# Patient Record
Sex: FEMALE | Race: WHITE
[De-identification: names, ages, dates, MRNs, and addresses within clinical notes are randomized per-mention and may not be internally consistent; named-entity substitution may affect disease eponyms.]

---

## 2020-05-03 ENCOUNTER — HOSPITAL ENCOUNTER (EMERGENCY)
Dept: HOSPITAL 47 - EC | Age: 40
Discharge: HOME | End: 2020-05-03
Payer: COMMERCIAL

## 2020-05-03 VITALS
DIASTOLIC BLOOD PRESSURE: 67 MMHG | TEMPERATURE: 98.3 F | SYSTOLIC BLOOD PRESSURE: 107 MMHG | HEART RATE: 55 BPM | RESPIRATION RATE: 18 BRPM

## 2020-05-03 DIAGNOSIS — Z23: ICD-10-CM
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
## 2020-05-03 DIAGNOSIS — S56.122A: Primary | ICD-10-CM
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
## 2020-05-03 PROCEDURE — 90715 TDAP VACCINE 7 YRS/> IM: CPT

## 2020-05-03 PROCEDURE — 73130 X-RAY EXAM OF HAND: CPT

## 2020-05-03 PROCEDURE — 99283 EMERGENCY DEPT VISIT LOW MDM: CPT

## 2020-05-03 PROCEDURE — 90471 IMMUNIZATION ADMIN: CPT

## 2020-05-03 PROCEDURE — 12002 RPR S/N/AX/GEN/TRNK2.6-7.5CM: CPT

## 2020-05-03 NOTE — ED
Wound/Laceration HPI





- General


Chief Complaint: Wound/Laceration


Stated Complaint: Hand injury/laceration


Time Seen by Provider: 05/03/20 11:16


Source: patient, RN notes reviewed, old records reviewed


Mode of arrival: ambulatory


Limitations: no limitations





- History of Present Illness


Initial Comments: 





Patient is a 39-year-old female who presents the emergency department today for 

evaluation for laceration of her left second digit.  Patient reports that she 

cut it on a kitchen knife.  She reports that she is unable to flex her finger.  

Patient states that she has normal sensation distally.  Her tetanus shot is not 

up-to-date.





- Related Data


                                  Previous Rx's











 Medication  Instructions  Recorded


 


Ibuprofen [Motrin] 600 mg PO Q8HR PRN #20 tab 05/03/20











                                    Allergies











Allergy/AdvReac Type Severity Reaction Status Date / Time


 


No Known Allergies Allergy   Verified 05/03/20 11:15














Review of Systems


ROS Statement: 


Those systems with pertinent positive or pertinent negative responses have been 

documented in the HPI.





ROS Other: All systems not noted in ROS Statement are negative.





Past Medical History


Past Medical History: No Reported History


History of Any Multi-Drug Resistant Organisms: None Reported


Past Surgical History: No Surgical Hx Reported


Past Psychological History: No Psychological Hx Reported


Smoking Status: Never smoker


Past Alcohol Use History: None Reported


Past Drug Use History: None Reported





General Exam





- General Exam Comments


Initial Comments: 





Anxious 39 year old female, pale and nervous about laceration. 


Limitations: no limitations


General appearance: alert, in no apparent distress


Head exam: Present: atraumatic, normocephalic, normal inspection


Eye exam: Present: normal appearance


ENT exam: Present: normal exam, mucous membranes moist


Neck exam: Present: normal inspection.  Absent: tenderness, meningismus, 

lymphadenopathy


Respiratory exam: Present: normal lung sounds bilaterally.  Absent: respiratory 

distress, wheezes, rales, rhonchi, stridor


Cardiovascular Exam: Present: regular rate, normal rhythm, normal heart sounds. 

Absent: systolic murmur, diastolic murmur, rubs, gallop, clicks


GI/Abdominal exam: Present: soft, normal bowel sounds.  Absent: distended, 

tenderness, guarding, rebound, rigid


Extremities exam: Present: normal inspection


  ** Right


Upper Arm exam: Present: normal inspection, full ROM


Elbow exam: Present: normal inspection, full ROM


Forearm Wrist exam: Present: normal inspection, full ROM


Hand Wrist exam: Present: laceration (3cm laceration over left palmar aspect of 

index finger PIP with flexor tendon laceration. Unable to flex 2nd digit. ).  

Absent: normal inspection, full ROM


Back exam: Present: normal inspection


Neurological exam: Present: alert


Psychiatric exam: Present: normal affect, normal mood





Course


                                   Vital Signs











  05/03/20





  11:12


 


Temperature 98.3 F


 


Pulse Rate 55 L


 


Respiratory 18





Rate 


 


Blood Pressure 107/67


 


O2 Sat by Pulse 99





Oximetry 














Procedures





- Laceration


  ** Laceration #1


Site: hand (2nd digit palmar aspect PIP)


Size (cm): 3


Description: linear


Depth: simple, single layer


Anesthetic Used: lidocaine 1%


Anesthesia Technique: local infiltration


Amount (mls): 2


Pre-repair: wound explored, irrigated extensively


Type of Sutures: nylon


Size of Sutures: 5-0


Number of Sutures: 6


Technique: simple, interrupted


Patient Tolerated Procedure: well, no complications





- Orthopedic Splinting/Casting


  ** Injury #1


Side: left


Upper Extremity Injury Location: wrist, hand


Upper Extremity Immobilizer: volar splint





Medical Decision Making





- Medical Decision Making





PAtient is a 39 year old female with lacearation from kitchen knife over left 

palmar aspect PIP of 2nd digit. There is laceration of flexor tendon, as patient

is unable to bend at first digit. Unable to idenitify tendon in laceration, as 

it is likely retracted. Hand xray is negative for acute process. Patient case 

was discussed with Dr. Baez, and advised to close the laceration and she 

can follow up in office for reevaluation of tendon. Discussed this with patient 

and wound was closed with 6 sutures. Patient did have normal sensation distally 

to light touch. Patient was placed in  a volar splint and return parameters 

discussed. Patient will see ortho tomorrow. 





- Radiology Data


Radiology results: report reviewed


Normal hand xray. No acute osseous lesion. 





Disposition


Clinical Impression: 


 Finger laceration involving tendon





Disposition: HOME SELF-CARE


Condition: Good


Instructions (If sedation given, give patient instructions):  Finger Laceration 

(ED), Tendon Laceration (ED)


Additional Instructions: 


Follow-up tomorrow with Dr. Baez, take Motrin and Tylenol for pain.  

Remain in the splint until seen by ortho.   Return to emergency department if 

any alarming signs or symptoms occur.


Prescriptions: 


Ibuprofen [Motrin] 600 mg PO Q8HR PRN #20 tab


 PRN Reason: Pain


Is patient prescribed a controlled substance at d/c from ED?: No


Referrals: 


Evelyn Paredes MD [Primary Care Provider] - 1-2 days


Raza Baez DO [Medical Doctor] - 1-2 days


Time of Disposition: 12:42

## 2020-05-03 NOTE — XR
EXAMINATION TYPE: XR hand complete LT , 3 VIEWS

 

DATE OF EXAM ORDERED: 5/3/2020

 

HISTORY: index finger laceration.

 

COMPARISON: None.

 

FINDINGS:  A ring overlies the proximal phalanx of the left ring finger. No fracture, dislocation or 
radiopaque foreign body is seen.

 

IMPRESSION: 

 

NO ACUTE OSSEOUS LESION.

## 2020-05-08 ENCOUNTER — HOSPITAL ENCOUNTER (OUTPATIENT)
Dept: HOSPITAL 47 - OR | Age: 40
Discharge: HOME | End: 2020-05-08
Attending: ORTHOPAEDIC SURGERY
Payer: SELF-PAY

## 2020-05-08 VITALS — SYSTOLIC BLOOD PRESSURE: 110 MMHG | DIASTOLIC BLOOD PRESSURE: 67 MMHG | HEART RATE: 68 BPM

## 2020-05-08 VITALS — BODY MASS INDEX: 41.8 KG/M2

## 2020-05-08 VITALS — TEMPERATURE: 97.4 F | RESPIRATION RATE: 16 BRPM
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
## 2020-05-08 DIAGNOSIS — S66.127A: Primary | ICD-10-CM

## 2020-05-08 DIAGNOSIS — Z79.1: ICD-10-CM

## 2020-05-08 DIAGNOSIS — Z87.891: ICD-10-CM

## 2020-05-08 DIAGNOSIS — S64.491A: ICD-10-CM

## 2020-05-08 DIAGNOSIS — Z97.3: ICD-10-CM

## 2020-05-08 DIAGNOSIS — I10: ICD-10-CM

## 2020-05-08 DIAGNOSIS — Z79.891: ICD-10-CM

## 2020-05-08 DIAGNOSIS — Z91.048: ICD-10-CM

## 2020-05-08 PROCEDURE — 26356 REPAIR FINGER/HAND TENDON: CPT

## 2020-05-08 PROCEDURE — 64831 REPAIR OF DIGIT NERVE: CPT

## 2020-05-08 PROCEDURE — 87635 SARS-COV-2 COVID-19 AMP PRB: CPT

## 2020-05-08 PROCEDURE — 81025 URINE PREGNANCY TEST: CPT

## 2020-05-09 NOTE — OP
OPERATIVE REPORT



DATE OF SERVICE:

05/08/2020



PREOPERATIVE DIAGNOSIS:

1. Complete laceration of FDS and FDP tendons, left little finger.

2. Complete laceration radial digital nerve, left index finger.



FINAL DIAGNOSES:

1. Complete laceration flexor digitorum superficialis, zone 2, left little finger.

2. Complete laceration flexor digitorum profundus.

3. Complete laceration radial digital nerve, left index finger.



PROCEDURE PERFORMED:

1. Primary repair complete laceration, flexor digitorum superficialis, zone 2, left

    little finger.

2. Primary repair complete laceration flexor digitorum profundus.

3. Repair complete laceration, radial digital nerve, PIP joint level, left index

    finger.



INDICATIONS:

39-year-old woman who accidentally cut herself at home a couple of days ago.

Intraoperatively, there was a complete laceration of both the FDS and FDP tendons.  The

FDS tendon retracted only to the distal aspect of the A2 pulley and was held in place

with the vincula.  The FDP tendon had retracted proximal to the A1 A2 pulley and had to

be retrieved in the distal palm.  The repairs were able to be done without any excess

tension and were quite secure.



PROCEDURE IN DETAIL:

A 39-year-old woman was taken operative suite given IV sedation and I performed a

digital block with combination of Xylocaine and Marcaine, both without epinephrine.

The hand was then prepped and draped in usual manner.  It was elevated, exsanguinated

and cuff was inflated to 250 mmHg.



The transverse incision over the volar PIP joint was opened and extended both

proximally and distally.  The procedure was done under 4.5 loupe magnification.



There was a clean transverse laceration of both tendons.  The distal stumps were easily

identifiable at the laceration level by flexing the DIP joint.  The proximal edges were

initially unseen.



The FDS tendon was easily retrieved from the distal aspect of the A2 pulley, attached

to the soft tissue vincula.  This was gently retracted, held in place with a 25-gauge

needle and a modified double grasping Maddox technique was used as a core suture and

further reinforced with a secondary mattress suture both with 3-0 Tycron.  This

provided a strong four core repair and was secure and anatomic.  The edges laid

together very nicely.



The FDP tendon was not able to be retrieved  from the wound.  A separate incision was

then made in the distal palmar skin crease.  Dissection at this level easily identified

the tendon stump.  It was withdrawn from the wound, cleansed, freshly cut at the edge

and a similar core suture of 3-0 Tycron with a modified double grasping Maddox

technique was inserted.



The suture was then passed anatomically from the distal palmar skin crease through the

A2 pulley and the chiasm of the superficialis tendon to its anatomic approximation of

the distal stump of the PIP joint.  The tendon was able to be extended distally and

again held with a 25-gauge needle, while a secure repair was performed.  In addition to

the 2 core initial locking suture, additional 4 strand mattress suture was used

producing a 6 core suture repair.  This was then supplemented with a running 6-0 nylon

peripheral tidying suture.  The end result was suture anatomic repair with no apparent

gapping.  When the support needle withdrawn.  The tendon flowed freely and was the

finger was able to be flexed by retracting both tendons independently in the distal

palm in a gentle manner.



Attention was then turned to the radial digital nerve.  The edges of the nerve were

isolated and dissected a little proximally and distally for mobilization purposes.

They easily came approximation without excess tension.  The edges were sharpened and a

small piece of suture material was used as a background.  An epineural repair using 9-0

nylon under 4.5 loupe magnification was then performed.  Four simple epineural sutures

reapproximated the nerve satisfactorily.



Further exploration revealed the ulnar digital nerve was intact as suspected

clinically.



The tourniquet was then released and the wound was thoroughly irrigated.  Hemostasis

was satisfactory with pressure.  Electrocautery was not necessary.



The skin was closed with interrupted 5-0 nylon suture.



The hand of the skin was again cleansed, soft bulky dressing was applied and splints

were applied holding the index and middle fingers in intrinsic plus position and the

wrist in slight flexion.  The patient was then taken recovery room in satisfactory

condition.





MMODL / IJN: [id number] / Job#: [id number]

## 2020-05-11 NOTE — CDI
Outpatient Documentation Clarification Form





Date:  5/11/20



CDS/ Name:  Tia Silverman

Phone: If any questions, call Greta Verma  at [phone]



Patient Name: Lara Fine

Account Number:  [id number]

Admit Date:  5/08/20

Discharge Date:  5/08/20

   

ATTENTION: The [facility] Coding Staff appreciate your assistance in clarifying 
documentation. Please respond to the clarification below the line at the bottom 
and electronically sign. The [facility] Coding staff will review the response and 
follow-up if needed. Please note: Queries are made part of the [facility] 
Record. If you have any questions, please contact the .



Dear Dr. Cesario Abraham, 



Please provide clarification as to which digit or digits the laceration 
occurred. The H&P states only the index finger for all lacerations.  The 
Procedure note under Pre-op diagnosis and final diagnosis  indicates that left 
index finger and the left little finger were involved. Please clarify.  







Thank you for your kind consideration.

_____________________________________________________________________________



See Op report 

MTDD

## 2022-09-09 ENCOUNTER — HOSPITAL ENCOUNTER (OUTPATIENT)
Dept: HOSPITAL 47 - RADMAMWWP | Age: 42
Discharge: HOME | End: 2022-09-09
Attending: FAMILY MEDICINE
Payer: COMMERCIAL

## 2022-09-09 DIAGNOSIS — Z12.31: Primary | ICD-10-CM

## 2022-09-09 PROCEDURE — 77067 SCR MAMMO BI INCL CAD: CPT

## 2022-09-13 NOTE — MM
Reason for Exam: Screening  (asymptomatic). 

Last mammogram was performed 1 year(s) and 5 month(s) ago. 





Risk Values: 

Koki 5 year model risk: 0.4%.

NCI Lifetime model risk: 6.6%.





Tissue Density: 

There are scattered fibroglandular densities.





Findings: 

Analyzed By CAD. 

There is no suspicious group of microcalcifications or new suspicious mass in either breast. 





Overall Assessment: Negative, BI-RAD 1





Management: 

Screening Mammogram of both breasts in 1 year.

A clinical breast exam by your physician is recommended on an annual basis and results should be

correlated with mammographic findings.



Electronically signed and approved by: Leopold M. Fregoli, M.D. Radiologis

## 2022-09-19 ENCOUNTER — HOSPITAL ENCOUNTER (OUTPATIENT)
Dept: HOSPITAL 47 - RADUSWWP | Age: 42
Discharge: HOME | End: 2022-09-19
Attending: FAMILY MEDICINE
Payer: COMMERCIAL

## 2022-09-19 DIAGNOSIS — K76.0: Primary | ICD-10-CM

## 2022-09-19 DIAGNOSIS — N20.0: ICD-10-CM

## 2022-09-19 DIAGNOSIS — N83.202: ICD-10-CM

## 2022-09-19 PROCEDURE — 76700 US EXAM ABDOM COMPLETE: CPT

## 2022-09-19 PROCEDURE — 76856 US EXAM PELVIC COMPLETE: CPT

## 2022-09-19 NOTE — US
EXAMINATION TYPE: US pelvic complete

 

DATE OF EXAM: 2022

 

COMPARISON: NONE

 

CLINICAL HISTORY:  41-year-old female Z01.419 ABN FINDINGS, R10.2 PELVIC PAIN, R10.9 ABD. Pain within
 the left side intermittently. . 

 

TECHNIQUE:  Transabdominal sonographic images of the pelvis were acquired.  Color Doppler and spectra
l waveform analysis of the left ovarian arteries and veins.

 

Date of LMP:  2022

 

EXAM MEASUREMENTS:

 

Uterus:  15.1 x 8.7 x 5.5 cm

Endometrial Stripe: 0.81 cm

Right Ovary:  3.9 x 2.5 x 2.0 cm

Left Ovary:  4.4 x 3.6 x 3.0 cm with a volume of 24.4 mL.

 

 

 

1. Uterus:  Anteverted.   Slightly bulky.

2. Endometrium:  Appears wnl

3. Right Ovary:  Appears wnl

4. Left Ovary: Borderline enlarged secondary to a cyst (probable dominant follicle or functional cyst
) measuring 2.9 x 3.0 x 2.4 cm. 

**Spectral, color and waveform doppler imaging shows faint arterial and satisfactory venous flow with
in the left ovary; there is no evidence for ovarian torsion within the left ovary. Right ovary does n
ot appear enlarged, pulsed wave only performed of right ovary.

5. Bilateral Adnexa:  Appears wnl

6. Posterior cul-de-sac:  Appears wnl

 

 

 

IMPRESSION: 

1. A 3.0 cm cyst of the left ovary, likely dominant follicle or functional cyst. If left sided pain p
ersists, follow-up in 6-8 weeks to ensure involution.

2. Endometrial stripe normal at 8 mm. No pelvic free fluid.

## 2022-09-19 NOTE — US
EXAMINATION TYPE: US abdomen complete

 

DATE OF EXAM: 9/19/2022

 

COMPARISON: NONE

 

CLINICAL HISTORY:  41-year-old female Z01.419 ABN FINDINGS,R10.2 PELVIC PAIN,R10.9 ABD. Pain.

 

TECHNIQUE: Multiple sonographic images of the abdomen are obtained.

 

FINDINGS:

 

EXAM MEASUREMENTS:

 

Liver Length:  16.7 cm   

Gallbladder Wall:  0.24 cm   

CBD:  0.32 cm

Spleen:  12.8 cm   

Right Kidney:  13.5 x 7.1 x 4.5 cm 

Left Kidney:  14.0 x 6.9  6.0 cm   

 

SONOGRAPHER NOTES: Limited due to gas.

 

Pancreas: Limited visualization of the tail due to bowel gas shadowing. Remainder shows no gross abno
rmality.

Liver:  Appears very coarse in echotexture. No focal lesion seen.

Gallbladder: Appears wnl

**Evidence for sonographic Irvin's sign:  No

CBD: Appears wnl 

Spleen: Appears wnl   

Right Kidney: Appears enlarged.  No hydronephrosis or masses seen. Slightly irregular contour.

Left Kidney: Extrarenal pelvis on the left. Scanned post void as well. Hyperechoic focus with posteri
or shadowing seen lower pole: 0.8 x 0.7 x 0.5 cm.   

Upper IVC: Appears wnl  

Abd Aorta:  Prox appears ectatic at 2.7 cm.

 

 

 

IMPRESSION: 

 

1. At least moderate hepatic steatosis. Correlation with LFTs, lipid profile, and patient risk factor
s.

2. No gallstones or biliary ductal dilatation.

3. A nonobstructive 8 mm left lower pole renal calculus.

## 2022-11-18 ENCOUNTER — HOSPITAL ENCOUNTER (OUTPATIENT)
Dept: HOSPITAL 47 - RADCTMAIN | Age: 42
Discharge: HOME | End: 2022-11-18
Attending: FAMILY MEDICINE
Payer: COMMERCIAL

## 2022-11-18 DIAGNOSIS — N83.202: ICD-10-CM

## 2022-11-18 DIAGNOSIS — R93.5: ICD-10-CM

## 2022-11-18 DIAGNOSIS — N20.0: Primary | ICD-10-CM

## 2022-11-18 DIAGNOSIS — R10.2: ICD-10-CM

## 2022-11-18 PROCEDURE — 74177 CT ABD & PELVIS W/CONTRAST: CPT

## 2022-11-18 NOTE — CT
EXAMINATION TYPE: CT abdomen pelvis w con

 

DATE OF EXAM: 11/18/2022

 

COMPARISON: Ultrasound dated 9/19/2022

 

HISTORY: Left sided abdominal and pelvic pain.

 

CT DLP: 1812.7 mGycm

 

CONTRAST: 

CT scan of the abdomen and pelvis is performed with Oral Contrast and with IV Contrast, patient injec
alex with 70ml mL of Isovue 300.

 

FINDINGS: 

LUNG BASES-: No visible nodule.  No infiltrate. 

 

LIVER/GB:   No calcified gallstones.  No space occupying hepatic lesion. Biliary tree is of normal ca
liber. 

 

PANCREAS:  No inflammation.  No distinct mass. 

 

SPLEEN:  No splenic enlargement.  No lesion seen. 

 

ADRENALS:  No nodule.  No thickening. 

 

KIDNEYS/BLADDER:  No hydronephrosis. 6 mm calculus lower pole left kidney. No additional calculi seen
. No distinct renal mass.  Urinary bladder grossly unremarkable. 

 

BOWEL: Normal appendix.  Normal bowel caliber.  No inflammation.

 

GENITAL ORGANS:  Uterus and ovaries appear unremarkable. No ovarian masses present. 

 

LYMPH NODES:  No greater than 1cm abdominal or pelvic lymph nodes are appreciated.

 

AORTA: No significant abnormality. 

 

OSSEOUS STRUCTURES:  No significant abnormality is seen. 

 

OTHER:  No significant additional abnormality is seen. 

 

IMPRESSION: 

1. Nonobstructing calculus left kidney.

2. Unremarkable ovaries without mass seen.

## 2024-11-06 ENCOUNTER — HOSPITAL ENCOUNTER (OUTPATIENT)
Dept: HOSPITAL 47 - EC | Age: 44
Setting detail: OBSERVATION
LOS: 2 days | Discharge: HOME | End: 2024-11-08
Attending: HOSPITALIST | Admitting: HOSPITALIST
Payer: COMMERCIAL

## 2024-11-06 DIAGNOSIS — D50.9: ICD-10-CM

## 2024-11-06 DIAGNOSIS — N30.20: ICD-10-CM

## 2024-11-06 DIAGNOSIS — N13.2: Primary | ICD-10-CM

## 2024-11-06 DIAGNOSIS — F32.A: ICD-10-CM

## 2024-11-06 LAB
ALBUMIN SERPL-MCNC: 4.2 G/DL (ref 3.5–5)
ALP SERPL-CCNC: 73 U/L (ref 38–126)
ALT SERPL-CCNC: 22 U/L (ref 4–34)
AMYLASE SERPL-CCNC: 39 U/L (ref 30–110)
ANION GAP SERPL CALC-SCNC: 8 MMOL/L
AST SERPL-CCNC: 24 U/L (ref 14–36)
BASOPHILS # BLD AUTO: 0 K/UL (ref 0–0.2)
BASOPHILS NFR BLD AUTO: 0 %
BUN SERPL-SCNC: 11 MG/DL (ref 7–17)
CALCIUM SPEC-MCNC: 8.9 MG/DL (ref 8.4–10.2)
CHLORIDE SERPL-SCNC: 103 MMOL/L (ref 98–107)
CO2 SERPL-SCNC: 25 MMOL/L (ref 22–30)
EOSINOPHIL # BLD AUTO: 0.2 K/UL (ref 0–0.7)
EOSINOPHIL NFR BLD AUTO: 2 %
ERYTHROCYTE [DISTWIDTH] IN BLOOD BY AUTOMATED COUNT: 3.96 M/UL (ref 3.8–5.4)
ERYTHROCYTE [DISTWIDTH] IN BLOOD: 13.5 % (ref 11.5–15.5)
GLUCOSE SERPL-MCNC: 120 MG/DL (ref 74–99)
HCT VFR BLD AUTO: 35.7 % (ref 34–46)
HGB BLD-MCNC: 11.6 GM/DL (ref 11.4–16)
LIPASE SERPL-CCNC: 31 U/L (ref 23–300)
LYMPHOCYTES # SPEC AUTO: 1.3 K/UL (ref 1–4.8)
LYMPHOCYTES NFR SPEC AUTO: 8 %
MCH RBC QN AUTO: 29.4 PG (ref 25–35)
MCHC RBC AUTO-ENTMCNC: 32.7 G/DL (ref 31–37)
MCV RBC AUTO: 90.1 FL (ref 80–100)
MONOCYTES # BLD AUTO: 0.6 K/UL (ref 0–1)
MONOCYTES NFR BLD AUTO: 4 %
NEUTROPHILS # BLD AUTO: 13.6 K/UL (ref 1.3–7.7)
NEUTROPHILS NFR BLD AUTO: 86 %
PH UR: 8 [PH] (ref 5–8)
PLATELET # BLD AUTO: 316 K/UL (ref 150–450)
POTASSIUM SERPL-SCNC: 4.4 MMOL/L (ref 3.5–5.1)
PROT SERPL-MCNC: 7 G/DL (ref 6.3–8.2)
SODIUM SERPL-SCNC: 136 MMOL/L (ref 137–145)
SP GR UR: 1.01 (ref 1–1.03)
UROBILINOGEN UR QL STRIP: <2 MG/DL (ref ?–2)
WBC # BLD AUTO: 15.9 K/UL (ref 3.8–10.6)

## 2024-11-06 PROCEDURE — 80048 BASIC METABOLIC PNL TOTAL CA: CPT

## 2024-11-06 PROCEDURE — 85025 COMPLETE CBC W/AUTO DIFF WBC: CPT

## 2024-11-06 PROCEDURE — 99285 EMERGENCY DEPT VISIT HI MDM: CPT

## 2024-11-06 PROCEDURE — 82150 ASSAY OF AMYLASE: CPT

## 2024-11-06 PROCEDURE — 36415 COLL VENOUS BLD VENIPUNCTURE: CPT

## 2024-11-06 PROCEDURE — 80053 COMPREHEN METABOLIC PANEL: CPT

## 2024-11-06 PROCEDURE — 83605 ASSAY OF LACTIC ACID: CPT

## 2024-11-06 PROCEDURE — 96376 TX/PRO/DX INJ SAME DRUG ADON: CPT

## 2024-11-06 PROCEDURE — 82365 CALCULUS SPECTROSCOPY: CPT

## 2024-11-06 PROCEDURE — 81025 URINE PREGNANCY TEST: CPT

## 2024-11-06 PROCEDURE — 74177 CT ABD & PELVIS W/CONTRAST: CPT

## 2024-11-06 PROCEDURE — 96374 THER/PROPH/DIAG INJ IV PUSH: CPT

## 2024-11-06 PROCEDURE — 52356 CYSTO/URETERO W/LITHOTRIPSY: CPT

## 2024-11-06 PROCEDURE — 81003 URINALYSIS AUTO W/O SCOPE: CPT

## 2024-11-06 PROCEDURE — 83690 ASSAY OF LIPASE: CPT

## 2024-11-06 PROCEDURE — 96375 TX/PRO/DX INJ NEW DRUG ADDON: CPT

## 2024-11-06 RX ADMIN — CEFAZOLIN STA MLS/HR: 330 INJECTION, POWDER, FOR SOLUTION INTRAMUSCULAR; INTRAVENOUS at 23:21

## 2024-11-06 RX ADMIN — HYDROMORPHONE HYDROCHLORIDE STA: 1 INJECTION, SOLUTION INTRAMUSCULAR; INTRAVENOUS; SUBCUTANEOUS at 23:55

## 2024-11-06 RX ADMIN — KETOROLAC TROMETHAMINE STA MG: 15 INJECTION, SOLUTION INTRAMUSCULAR; INTRAVENOUS at 23:23

## 2024-11-06 RX ADMIN — ONDANSETRON STA MG: 2 INJECTION INTRAMUSCULAR; INTRAVENOUS at 23:22

## 2024-11-07 RX ADMIN — POTASSIUM CHLORIDE SCH MLS/HR: 14.9 INJECTION, SOLUTION INTRAVENOUS at 02:44

## 2024-11-07 RX ADMIN — HYDROMORPHONE HYDROCHLORIDE STA MG: 1 INJECTION, SOLUTION INTRAMUSCULAR; INTRAVENOUS; SUBCUTANEOUS at 01:56

## 2024-11-07 RX ADMIN — HYDROMORPHONE HYDROCHLORIDE STA MG: 1 INJECTION, SOLUTION INTRAMUSCULAR; INTRAVENOUS; SUBCUTANEOUS at 00:51

## 2024-11-07 RX ADMIN — FAMOTIDINE SCH MG: 20 TABLET, FILM COATED ORAL at 08:30

## 2024-11-07 RX ADMIN — CEFAZOLIN SCH MLS/HR: 330 INJECTION, POWDER, FOR SOLUTION INTRAMUSCULAR; INTRAVENOUS at 02:44

## 2024-11-07 RX ADMIN — KETOROLAC TROMETHAMINE PRN MG: 15 INJECTION, SOLUTION INTRAMUSCULAR; INTRAVENOUS at 12:59

## 2024-11-07 RX ADMIN — HYDROMORPHONE HYDROCHLORIDE PRN MG: 1 INJECTION, SOLUTION INTRAMUSCULAR; INTRAVENOUS; SUBCUTANEOUS at 07:11

## 2024-11-08 VITALS — HEART RATE: 62 BPM | SYSTOLIC BLOOD PRESSURE: 134 MMHG | DIASTOLIC BLOOD PRESSURE: 80 MMHG

## 2024-11-08 VITALS — RESPIRATION RATE: 16 BRPM | TEMPERATURE: 98.2 F

## 2024-11-08 LAB
ANION GAP SERPL CALC-SCNC: 9.8 MMOL/L (ref 4–12)
BASOPHILS # BLD AUTO: 0.06 X 10*3/UL (ref 0–0.1)
BASOPHILS NFR BLD AUTO: 0.6 %
BUN SERPL-SCNC: 13.5 MG/DL (ref 9–27)
BUN/CREAT SERPL: 10.38 RATIO (ref 12–20)
CALCIUM SPEC-MCNC: 8.4 MG/DL (ref 8.7–10.3)
CHLORIDE SERPL-SCNC: 105 MMOL/L (ref 96–109)
CO2 SERPL-SCNC: 25.2 MMOL/L (ref 21.6–31.8)
EOSINOPHIL # BLD AUTO: 0.26 X 10*3/UL (ref 0.04–0.35)
EOSINOPHIL NFR BLD AUTO: 2.6 %
ERYTHROCYTE [DISTWIDTH] IN BLOOD BY AUTOMATED COUNT: 3.44 X 10*6/UL (ref 4.1–5.2)
ERYTHROCYTE [DISTWIDTH] IN BLOOD: 13.6 % (ref 11.5–14.5)
GLUCOSE SERPL-MCNC: 86 MG/DL (ref 70–110)
HCT VFR BLD AUTO: 31.9 % (ref 37.2–46.3)
HGB BLD-MCNC: 10.3 G/DL (ref 12–15)
IMM GRANULOCYTES BLD QL AUTO: 0.4 %
LYMPHOCYTES # SPEC AUTO: 1.78 X 10*3/UL (ref 0.9–5)
LYMPHOCYTES NFR SPEC AUTO: 17.8 %
MCH RBC QN AUTO: 29.9 PG (ref 27–32)
MCHC RBC AUTO-ENTMCNC: 32.3 G/DL (ref 32–37)
MCV RBC AUTO: 92.7 FL (ref 80–97)
MONOCYTES # BLD AUTO: 0.65 X 10*3/UL (ref 0.2–1)
MONOCYTES NFR BLD AUTO: 6.5 %
NEUTROPHILS # BLD AUTO: 7.21 X 10*3/UL (ref 1.8–7.7)
NEUTROPHILS NFR BLD AUTO: 72.1 %
NRBC BLD AUTO-RTO: 0 X 10*3/UL (ref 0–0.01)
PLATELET # BLD AUTO: 288 X 10*3/UL (ref 140–440)
POTASSIUM SERPL-SCNC: 4.6 MMOL/L (ref 3.5–5.5)
SODIUM SERPL-SCNC: 140 MMOL/L (ref 135–145)
WBC # BLD AUTO: 10 X 10*3/UL (ref 4.5–10)

## 2024-11-08 RX ADMIN — POTASSIUM CHLORIDE ONE MLS: 14.9 INJECTION, SOLUTION INTRAVENOUS at 12:15

## 2024-11-08 RX ADMIN — DEXAMETHASONE SODIUM PHOSPHATE STA MG: 4 INJECTION, SOLUTION INTRAMUSCULAR; INTRAVENOUS at 11:27

## 2024-11-08 RX ADMIN — POTASSIUM CHLORIDE ONE MLS: 14.9 INJECTION, SOLUTION INTRAVENOUS at 11:00

## 2024-11-08 RX ADMIN — FENTANYL CITRATE PRN MCG: 50 INJECTION, SOLUTION INTRAMUSCULAR; INTRAVENOUS at 11:46

## 2024-11-08 RX ADMIN — ONDANSETRON PRN MG: 2 INJECTION INTRAMUSCULAR; INTRAVENOUS at 11:17

## 2024-11-08 RX ADMIN — SCOPALAMINE STA PATCH: 1 PATCH, EXTENDED RELEASE TRANSDERMAL at 12:05
